# Patient Record
Sex: FEMALE | Race: OTHER | HISPANIC OR LATINO | URBAN - METROPOLITAN AREA
[De-identification: names, ages, dates, MRNs, and addresses within clinical notes are randomized per-mention and may not be internally consistent; named-entity substitution may affect disease eponyms.]

---

## 2020-11-30 ENCOUNTER — APPOINTMENT (OUTPATIENT)
Dept: RADIOLOGY | Facility: HOSPITAL | Age: 25
End: 2020-11-30
Payer: COMMERCIAL

## 2020-11-30 ENCOUNTER — HOSPITAL ENCOUNTER (OUTPATIENT)
Facility: HOSPITAL | Age: 25
Setting detail: OBSERVATION
Discharge: HOME | End: 2020-12-02
Attending: SURGERY | Admitting: SURGERY
Payer: COMMERCIAL

## 2020-11-30 ENCOUNTER — APPOINTMENT (EMERGENCY)
Dept: RADIOLOGY | Facility: HOSPITAL | Age: 25
End: 2020-11-30
Payer: COMMERCIAL

## 2020-11-30 DIAGNOSIS — S32.591A CLOSED FRACTURE OF RIGHT INFERIOR PUBIC RAMUS, INITIAL ENCOUNTER (HCC): Primary | ICD-10-CM

## 2020-11-30 DIAGNOSIS — S32.592A CLOSED FRACTURE OF LEFT INFERIOR PUBIC RAMUS, INITIAL ENCOUNTER (HCC): ICD-10-CM

## 2020-11-30 PROBLEM — S32.009A CLOSED FRACTURE OF TRANSVERSE PROCESS OF LUMBAR VERTEBRA (HCC): Status: ACTIVE | Noted: 2020-11-30

## 2020-11-30 PROBLEM — S27.322A BILATERAL PULMONARY CONTUSION: Status: ACTIVE | Noted: 2020-11-30

## 2020-11-30 LAB
ANION GAP SERPL CALCULATED.3IONS-SCNC: 7 MMOL/L (ref 4–13)
APTT PPP: 22 SECONDS (ref 23–37)
BASE EXCESS BLDA CALC-SCNC: -8 MMOL/L (ref -2–3)
BASOPHILS # BLD AUTO: 0.07 THOUSANDS/ΜL (ref 0–0.1)
BASOPHILS NFR BLD AUTO: 1 % (ref 0–1)
BUN SERPL-MCNC: 16 MG/DL (ref 5–25)
CA-I BLD-SCNC: 1.23 MMOL/L (ref 1.12–1.32)
CALCIUM SERPL-MCNC: 8.9 MG/DL (ref 8.3–10.1)
CHLORIDE SERPL-SCNC: 112 MMOL/L (ref 100–108)
CO2 SERPL-SCNC: 20 MMOL/L (ref 21–32)
CREAT SERPL-MCNC: 1.04 MG/DL (ref 0.6–1.3)
EOSINOPHIL # BLD AUTO: 0.19 THOUSAND/ΜL (ref 0–0.61)
EOSINOPHIL NFR BLD AUTO: 2 % (ref 0–6)
ERYTHROCYTE [DISTWIDTH] IN BLOOD BY AUTOMATED COUNT: 13.1 % (ref 11.6–15.1)
FLUAV RNA RESP QL NAA+PROBE: NEGATIVE
FLUBV RNA RESP QL NAA+PROBE: NEGATIVE
GFR SERPL CREATININE-BSD FRML MDRD: 75 ML/MIN/1.73SQ M
GLUCOSE SERPL-MCNC: 154 MG/DL (ref 65–140)
GLUCOSE SERPL-MCNC: 163 MG/DL (ref 65–140)
HCO3 BLDA-SCNC: 16.7 MMOL/L (ref 24–30)
HCT VFR BLD AUTO: 37.9 % (ref 34.8–46.1)
HCT VFR BLD CALC: 38 % (ref 34.8–46.1)
HGB BLD-MCNC: 12.6 G/DL (ref 11.5–15.4)
HGB BLDA-MCNC: 12.9 G/DL (ref 11.5–15.4)
IMM GRANULOCYTES # BLD AUTO: 0.13 THOUSAND/UL (ref 0–0.2)
IMM GRANULOCYTES NFR BLD AUTO: 1 % (ref 0–2)
INR PPP: 1.06 (ref 0.84–1.19)
LYMPHOCYTES # BLD AUTO: 3.01 THOUSANDS/ΜL (ref 0.6–4.47)
LYMPHOCYTES NFR BLD AUTO: 25 % (ref 14–44)
MCH RBC QN AUTO: 31.5 PG (ref 26.8–34.3)
MCHC RBC AUTO-ENTMCNC: 33.2 G/DL (ref 31.4–37.4)
MCV RBC AUTO: 95 FL (ref 82–98)
MONOCYTES # BLD AUTO: 0.55 THOUSAND/ΜL (ref 0.17–1.22)
MONOCYTES NFR BLD AUTO: 5 % (ref 4–12)
NEUTROPHILS # BLD AUTO: 7.97 THOUSANDS/ΜL (ref 1.85–7.62)
NEUTS SEG NFR BLD AUTO: 66 % (ref 43–75)
NRBC BLD AUTO-RTO: 0 /100 WBCS
PCO2 BLD: 18 MMOL/L (ref 21–32)
PCO2 BLD: 30.3 MM HG (ref 42–50)
PH BLD: 7.35 [PH] (ref 7.3–7.4)
PLATELET # BLD AUTO: 280 THOUSANDS/UL (ref 149–390)
PMV BLD AUTO: 11 FL (ref 8.9–12.7)
PO2 BLD: 38 MM HG (ref 35–45)
POTASSIUM BLD-SCNC: 4 MMOL/L (ref 3.5–5.3)
POTASSIUM SERPL-SCNC: 4.3 MMOL/L (ref 3.5–5.3)
PROTHROMBIN TIME: 13.8 SECONDS (ref 11.6–14.5)
RBC # BLD AUTO: 4 MILLION/UL (ref 3.81–5.12)
RSV RNA RESP QL NAA+PROBE: NEGATIVE
SAO2 % BLD FROM PO2: 70 % (ref 60–85)
SARS-COV-2 RNA RESP QL NAA+PROBE: NEGATIVE
SODIUM BLD-SCNC: 140 MMOL/L (ref 136–145)
SODIUM SERPL-SCNC: 139 MMOL/L (ref 136–145)
SPECIMEN SOURCE: ABNORMAL
WBC # BLD AUTO: 11.92 THOUSAND/UL (ref 4.31–10.16)

## 2020-11-30 PROCEDURE — 70450 CT HEAD/BRAIN W/O DYE: CPT

## 2020-11-30 PROCEDURE — 85014 HEMATOCRIT: CPT

## 2020-11-30 PROCEDURE — 99285 EMERGENCY DEPT VISIT HI MDM: CPT

## 2020-11-30 PROCEDURE — 84132 ASSAY OF SERUM POTASSIUM: CPT

## 2020-11-30 PROCEDURE — 85610 PROTHROMBIN TIME: CPT | Performed by: SURGERY

## 2020-11-30 PROCEDURE — 85025 COMPLETE CBC W/AUTO DIFF WBC: CPT | Performed by: SURGERY

## 2020-11-30 PROCEDURE — NC001 PR NO CHARGE: Performed by: ORTHOPAEDIC SURGERY

## 2020-11-30 PROCEDURE — 74177 CT ABD & PELVIS W/CONTRAST: CPT

## 2020-11-30 PROCEDURE — 36415 COLL VENOUS BLD VENIPUNCTURE: CPT | Performed by: SURGERY

## 2020-11-30 PROCEDURE — 84295 ASSAY OF SERUM SODIUM: CPT

## 2020-11-30 PROCEDURE — 99202 OFFICE O/P NEW SF 15 MIN: CPT | Performed by: ORTHOPAEDIC SURGERY

## 2020-11-30 PROCEDURE — 99219 PR INITIAL OBSERVATION CARE/DAY 50 MINUTES: CPT | Performed by: SURGERY

## 2020-11-30 PROCEDURE — 73560 X-RAY EXAM OF KNEE 1 OR 2: CPT

## 2020-11-30 PROCEDURE — 80048 BASIC METABOLIC PNL TOTAL CA: CPT | Performed by: SURGERY

## 2020-11-30 PROCEDURE — 72190 X-RAY EXAM OF PELVIS: CPT

## 2020-11-30 PROCEDURE — NC001 PR NO CHARGE: Performed by: EMERGENCY MEDICINE

## 2020-11-30 PROCEDURE — 82330 ASSAY OF CALCIUM: CPT

## 2020-11-30 PROCEDURE — 82947 ASSAY GLUCOSE BLOOD QUANT: CPT

## 2020-11-30 PROCEDURE — 85730 THROMBOPLASTIN TIME PARTIAL: CPT | Performed by: SURGERY

## 2020-11-30 PROCEDURE — 72125 CT NECK SPINE W/O DYE: CPT

## 2020-11-30 PROCEDURE — 0241U HB NFCT DS VIR RESP RNA 4 TRGT: CPT | Performed by: SURGERY

## 2020-11-30 PROCEDURE — 82803 BLOOD GASES ANY COMBINATION: CPT

## 2020-11-30 PROCEDURE — 71260 CT THORAX DX C+: CPT

## 2020-11-30 RX ORDER — ONDANSETRON 2 MG/ML
4 INJECTION INTRAMUSCULAR; INTRAVENOUS EVERY 6 HOURS PRN
Status: DISCONTINUED | OUTPATIENT
Start: 2020-11-30 | End: 2020-12-02 | Stop reason: HOSPADM

## 2020-11-30 RX ORDER — TOPIRAMATE 50 MG/1
50 TABLET, FILM COATED ORAL 2 TIMES DAILY
COMMUNITY

## 2020-11-30 RX ORDER — SUMATRIPTAN 50 MG/1
50 TABLET, FILM COATED ORAL ONCE AS NEEDED
COMMUNITY

## 2020-11-30 RX ORDER — OXYCODONE HYDROCHLORIDE 10 MG/1
10 TABLET ORAL EVERY 4 HOURS PRN
Status: DISCONTINUED | OUTPATIENT
Start: 2020-11-30 | End: 2020-12-02 | Stop reason: HOSPADM

## 2020-11-30 RX ORDER — ACETAMINOPHEN 325 MG/1
650 TABLET ORAL EVERY 6 HOURS PRN
Status: DISCONTINUED | OUTPATIENT
Start: 2020-11-30 | End: 2020-12-01

## 2020-11-30 RX ORDER — OXYCODONE HYDROCHLORIDE 5 MG/1
5 TABLET ORAL EVERY 4 HOURS PRN
Status: DISCONTINUED | OUTPATIENT
Start: 2020-11-30 | End: 2020-12-02 | Stop reason: HOSPADM

## 2020-11-30 RX ORDER — SODIUM CHLORIDE 9 MG/ML
100 INJECTION, SOLUTION INTRAVENOUS CONTINUOUS
Status: DISCONTINUED | OUTPATIENT
Start: 2020-11-30 | End: 2020-11-30

## 2020-11-30 RX ORDER — SUMATRIPTAN 50 MG/1
50 TABLET, FILM COATED ORAL EVERY 12 HOURS PRN
Status: DISCONTINUED | OUTPATIENT
Start: 2020-11-30 | End: 2020-12-02 | Stop reason: HOSPADM

## 2020-11-30 RX ORDER — HYDROMORPHONE HCL/PF 1 MG/ML
0.5 SYRINGE (ML) INJECTION
Status: DISCONTINUED | OUTPATIENT
Start: 2020-11-30 | End: 2020-12-02 | Stop reason: HOSPADM

## 2020-11-30 RX ORDER — TOPIRAMATE 25 MG/1
50 TABLET ORAL 2 TIMES DAILY
Status: DISCONTINUED | OUTPATIENT
Start: 2020-11-30 | End: 2020-12-02 | Stop reason: HOSPADM

## 2020-11-30 RX ORDER — SUMATRIPTAN 50 MG/1
50 TABLET, FILM COATED ORAL EVERY 12 HOURS
Status: DISCONTINUED | OUTPATIENT
Start: 2020-11-30 | End: 2020-11-30

## 2020-11-30 RX ORDER — LIDOCAINE 50 MG/G
2 PATCH TOPICAL DAILY
Status: DISCONTINUED | OUTPATIENT
Start: 2020-11-30 | End: 2020-12-02 | Stop reason: HOSPADM

## 2020-11-30 RX ADMIN — OXYCODONE HYDROCHLORIDE 10 MG: 10 TABLET ORAL at 17:22

## 2020-11-30 RX ADMIN — TOPIRAMATE 50 MG: 25 TABLET, FILM COATED ORAL at 17:18

## 2020-11-30 RX ADMIN — IOHEXOL 100 ML: 350 INJECTION, SOLUTION INTRAVENOUS at 05:02

## 2020-11-30 RX ADMIN — OXYCODONE HYDROCHLORIDE 10 MG: 10 TABLET ORAL at 12:14

## 2020-11-30 RX ADMIN — HYDROMORPHONE HYDROCHLORIDE 0.5 MG: 1 INJECTION, SOLUTION INTRAMUSCULAR; INTRAVENOUS; SUBCUTANEOUS at 08:39

## 2020-11-30 RX ADMIN — ONDANSETRON 4 MG: 2 INJECTION INTRAMUSCULAR; INTRAVENOUS at 12:14

## 2020-11-30 RX ADMIN — OXYCODONE HYDROCHLORIDE 5 MG: 5 TABLET ORAL at 08:39

## 2020-11-30 RX ADMIN — LIDOCAINE 2 PATCH: 50 PATCH TOPICAL at 10:10

## 2020-11-30 RX ADMIN — SODIUM CHLORIDE 100 ML/HR: 0.9 INJECTION, SOLUTION INTRAVENOUS at 08:32

## 2020-12-01 LAB
ALBUMIN SERPL BCP-MCNC: 3.7 G/DL (ref 3.5–5)
ALP SERPL-CCNC: 85 U/L (ref 46–116)
ALT SERPL W P-5'-P-CCNC: 84 U/L (ref 12–78)
ANION GAP SERPL CALCULATED.3IONS-SCNC: 8 MMOL/L (ref 4–13)
AST SERPL W P-5'-P-CCNC: 43 U/L (ref 5–45)
BASOPHILS # BLD AUTO: 0.08 THOUSANDS/ΜL (ref 0–0.1)
BASOPHILS NFR BLD AUTO: 1 % (ref 0–1)
BILIRUB SERPL-MCNC: 0.79 MG/DL (ref 0.2–1)
BUN SERPL-MCNC: 11 MG/DL (ref 5–25)
CALCIUM SERPL-MCNC: 9.4 MG/DL (ref 8.3–10.1)
CHLORIDE SERPL-SCNC: 111 MMOL/L (ref 100–108)
CO2 SERPL-SCNC: 21 MMOL/L (ref 21–32)
CREAT SERPL-MCNC: 0.93 MG/DL (ref 0.6–1.3)
EOSINOPHIL # BLD AUTO: 0.13 THOUSAND/ΜL (ref 0–0.61)
EOSINOPHIL NFR BLD AUTO: 1 % (ref 0–6)
ERYTHROCYTE [DISTWIDTH] IN BLOOD BY AUTOMATED COUNT: 13.2 % (ref 11.6–15.1)
GFR SERPL CREATININE-BSD FRML MDRD: 86 ML/MIN/1.73SQ M
GLUCOSE SERPL-MCNC: 98 MG/DL (ref 65–140)
HCT VFR BLD AUTO: 38 % (ref 34.8–46.1)
HGB BLD-MCNC: 12.5 G/DL (ref 11.5–15.4)
IMM GRANULOCYTES # BLD AUTO: 0.01 THOUSAND/UL (ref 0–0.2)
IMM GRANULOCYTES NFR BLD AUTO: 0 % (ref 0–2)
LYMPHOCYTES # BLD AUTO: 2.2 THOUSANDS/ΜL (ref 0.6–4.47)
LYMPHOCYTES NFR BLD AUTO: 23 % (ref 14–44)
MCH RBC QN AUTO: 31.2 PG (ref 26.8–34.3)
MCHC RBC AUTO-ENTMCNC: 32.9 G/DL (ref 31.4–37.4)
MCV RBC AUTO: 95 FL (ref 82–98)
MONOCYTES # BLD AUTO: 0.98 THOUSAND/ΜL (ref 0.17–1.22)
MONOCYTES NFR BLD AUTO: 10 % (ref 4–12)
NEUTROPHILS # BLD AUTO: 6.16 THOUSANDS/ΜL (ref 1.85–7.62)
NEUTS SEG NFR BLD AUTO: 65 % (ref 43–75)
NRBC BLD AUTO-RTO: 0 /100 WBCS
PLATELET # BLD AUTO: 281 THOUSANDS/UL (ref 149–390)
PMV BLD AUTO: 10.3 FL (ref 8.9–12.7)
POTASSIUM SERPL-SCNC: 3.2 MMOL/L (ref 3.5–5.3)
PROT SERPL-MCNC: 7.7 G/DL (ref 6.4–8.2)
RBC # BLD AUTO: 4.01 MILLION/UL (ref 3.81–5.12)
SODIUM SERPL-SCNC: 140 MMOL/L (ref 136–145)
WBC # BLD AUTO: 9.56 THOUSAND/UL (ref 4.31–10.16)

## 2020-12-01 PROCEDURE — 85025 COMPLETE CBC W/AUTO DIFF WBC: CPT | Performed by: PHYSICIAN ASSISTANT

## 2020-12-01 PROCEDURE — 97162 PT EVAL MOD COMPLEX 30 MIN: CPT

## 2020-12-01 PROCEDURE — 97166 OT EVAL MOD COMPLEX 45 MIN: CPT

## 2020-12-01 PROCEDURE — 99225 PR SBSQ OBSERVATION CARE/DAY 25 MINUTES: CPT | Performed by: SURGERY

## 2020-12-01 PROCEDURE — 80053 COMPREHEN METABOLIC PANEL: CPT | Performed by: PHYSICIAN ASSISTANT

## 2020-12-01 RX ORDER — GABAPENTIN 100 MG/1
100 CAPSULE ORAL 3 TIMES DAILY
Status: DISCONTINUED | OUTPATIENT
Start: 2020-12-01 | End: 2020-12-02 | Stop reason: HOSPADM

## 2020-12-01 RX ORDER — CALCIUM CARBONATE 200(500)MG
500 TABLET,CHEWABLE ORAL DAILY PRN
Status: DISCONTINUED | OUTPATIENT
Start: 2020-12-01 | End: 2020-12-02 | Stop reason: HOSPADM

## 2020-12-01 RX ORDER — ACETAMINOPHEN 325 MG/1
975 TABLET ORAL EVERY 8 HOURS SCHEDULED
Status: DISCONTINUED | OUTPATIENT
Start: 2020-12-01 | End: 2020-12-02 | Stop reason: HOSPADM

## 2020-12-01 RX ORDER — METHOCARBAMOL 750 MG/1
750 TABLET, FILM COATED ORAL EVERY 6 HOURS SCHEDULED
Status: DISCONTINUED | OUTPATIENT
Start: 2020-12-01 | End: 2020-12-02 | Stop reason: HOSPADM

## 2020-12-01 RX ORDER — PANTOPRAZOLE SODIUM 40 MG/1
40 TABLET, DELAYED RELEASE ORAL
Status: DISCONTINUED | OUTPATIENT
Start: 2020-12-01 | End: 2020-12-02 | Stop reason: HOSPADM

## 2020-12-01 RX ADMIN — OXYCODONE HYDROCHLORIDE 10 MG: 10 TABLET ORAL at 09:15

## 2020-12-01 RX ADMIN — GABAPENTIN 100 MG: 100 CAPSULE ORAL at 17:23

## 2020-12-01 RX ADMIN — OXYCODONE HYDROCHLORIDE 10 MG: 10 TABLET ORAL at 05:03

## 2020-12-01 RX ADMIN — LIDOCAINE 2 PATCH: 50 PATCH TOPICAL at 09:15

## 2020-12-01 RX ADMIN — TOPIRAMATE 50 MG: 25 TABLET, FILM COATED ORAL at 17:23

## 2020-12-01 RX ADMIN — OXYCODONE HYDROCHLORIDE 10 MG: 10 TABLET ORAL at 20:55

## 2020-12-01 RX ADMIN — METHOCARBAMOL TABLETS 750 MG: 750 TABLET, COATED ORAL at 17:23

## 2020-12-01 RX ADMIN — ENOXAPARIN SODIUM 30 MG: 30 INJECTION SUBCUTANEOUS at 17:24

## 2020-12-01 RX ADMIN — GABAPENTIN 100 MG: 100 CAPSULE ORAL at 09:14

## 2020-12-01 RX ADMIN — METHOCARBAMOL TABLETS 750 MG: 750 TABLET, COATED ORAL at 09:14

## 2020-12-01 RX ADMIN — TOPIRAMATE 50 MG: 25 TABLET, FILM COATED ORAL at 09:14

## 2020-12-01 RX ADMIN — ENOXAPARIN SODIUM 30 MG: 30 INJECTION SUBCUTANEOUS at 05:04

## 2020-12-01 RX ADMIN — ONDANSETRON 4 MG: 2 INJECTION INTRAMUSCULAR; INTRAVENOUS at 11:28

## 2020-12-01 RX ADMIN — GABAPENTIN 100 MG: 100 CAPSULE ORAL at 20:55

## 2020-12-01 RX ADMIN — ACETAMINOPHEN 975 MG: 325 TABLET, FILM COATED ORAL at 09:14

## 2020-12-02 VITALS
SYSTOLIC BLOOD PRESSURE: 110 MMHG | TEMPERATURE: 98.1 F | BODY MASS INDEX: 27.1 KG/M2 | HEART RATE: 79 BPM | WEIGHT: 147.27 LBS | DIASTOLIC BLOOD PRESSURE: 66 MMHG | HEIGHT: 62 IN | RESPIRATION RATE: 18 BRPM | OXYGEN SATURATION: 99 %
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
## 2020-12-02 PROCEDURE — 99217 PR OBSERVATION CARE DISCHARGE MANAGEMENT: CPT | Performed by: PHYSICIAN ASSISTANT

## 2020-12-02 PROCEDURE — NC001 PR NO CHARGE: Performed by: PHYSICIAN ASSISTANT

## 2020-12-02 RX ORDER — AMOXICILLIN 250 MG
1 CAPSULE ORAL
Status: DISCONTINUED | OUTPATIENT
Start: 2020-12-02 | End: 2020-12-02 | Stop reason: HOSPADM

## 2020-12-02 RX ORDER — OXYCODONE HYDROCHLORIDE 5 MG/1
5-10 TABLET ORAL EVERY 4 HOURS PRN
Qty: 36 TABLET | Refills: 0 | Status: SHIPPED | OUTPATIENT
Start: 2020-12-02

## 2020-12-02 RX ORDER — GABAPENTIN 100 MG/1
100 CAPSULE ORAL 3 TIMES DAILY
Qty: 42 CAPSULE | Refills: 0 | Status: SHIPPED | OUTPATIENT
Start: 2020-12-02 | End: 2020-12-16

## 2020-12-02 RX ORDER — ACETAMINOPHEN 325 MG/1
650 TABLET ORAL EVERY 4 HOURS PRN
Qty: 120 TABLET | Refills: 0 | Status: SHIPPED | OUTPATIENT
Start: 2020-12-02

## 2020-12-02 RX ORDER — METHOCARBAMOL 750 MG/1
750 TABLET, FILM COATED ORAL EVERY 6 HOURS SCHEDULED
Qty: 40 TABLET | Refills: 1 | Status: SHIPPED | OUTPATIENT
Start: 2020-12-02 | End: 2020-12-09

## 2020-12-02 RX ADMIN — PANTOPRAZOLE SODIUM 40 MG: 40 TABLET, DELAYED RELEASE ORAL at 05:50

## 2020-12-02 RX ADMIN — ACETAMINOPHEN 975 MG: 325 TABLET, FILM COATED ORAL at 05:50

## 2020-12-02 RX ADMIN — LIDOCAINE 2 PATCH: 50 PATCH TOPICAL at 08:28

## 2020-12-02 RX ADMIN — ENOXAPARIN SODIUM 30 MG: 30 INJECTION SUBCUTANEOUS at 05:50

## 2020-12-02 RX ADMIN — GABAPENTIN 100 MG: 100 CAPSULE ORAL at 08:29

## 2020-12-02 RX ADMIN — METHOCARBAMOL TABLETS 750 MG: 750 TABLET, COATED ORAL at 05:50

## 2020-12-02 RX ADMIN — TOPIRAMATE 50 MG: 25 TABLET, FILM COATED ORAL at 08:29

## 2020-12-20 DIAGNOSIS — S32.591A CLOSED FRACTURE OF RIGHT INFERIOR PUBIC RAMUS, INITIAL ENCOUNTER (HCC): Primary | ICD-10-CM

## 2020-12-23 ENCOUNTER — HOSPITAL ENCOUNTER (OUTPATIENT)
Dept: RADIOLOGY | Facility: HOSPITAL | Age: 25
Discharge: HOME | End: 2020-12-23
Attending: ORTHOPAEDIC SURGERY
Payer: COMMERCIAL

## 2020-12-23 ENCOUNTER — OFFICE VISIT (OUTPATIENT)
Dept: OBGYN CLINIC | Facility: HOSPITAL | Age: 25
End: 2020-12-23
Payer: COMMERCIAL

## 2020-12-23 VITALS
DIASTOLIC BLOOD PRESSURE: 76 MMHG | WEIGHT: 140 LBS | HEART RATE: 75 BPM | SYSTOLIC BLOOD PRESSURE: 116 MMHG | BODY MASS INDEX: 25.76 KG/M2 | HEIGHT: 62 IN

## 2020-12-23 DIAGNOSIS — S32.591A CLOSED FRACTURE OF RIGHT INFERIOR PUBIC RAMUS, INITIAL ENCOUNTER (HCC): ICD-10-CM

## 2020-12-23 DIAGNOSIS — S32.592D: Primary | ICD-10-CM

## 2020-12-23 PROCEDURE — 72190 X-RAY EXAM OF PELVIS: CPT

## 2020-12-23 PROCEDURE — 99203 OFFICE O/P NEW LOW 30 MIN: CPT | Performed by: ORTHOPAEDIC SURGERY

## 2020-12-28 ENCOUNTER — EVALUATION (OUTPATIENT)
Dept: PHYSICAL THERAPY | Facility: REHABILITATION | Age: 25
End: 2020-12-28
Payer: COMMERCIAL

## 2020-12-28 DIAGNOSIS — S32.592S CLOSED FRACTURE OF MULTIPLE PUBIC RAMI, LEFT, SEQUELA: ICD-10-CM

## 2020-12-28 DIAGNOSIS — S32.009S LUMBAR TRANSVERSE PROCESS FRACTURE, SEQUELA: Primary | ICD-10-CM

## 2020-12-28 PROCEDURE — 97112 NEUROMUSCULAR REEDUCATION: CPT | Performed by: PHYSICAL THERAPIST

## 2020-12-28 PROCEDURE — 97162 PT EVAL MOD COMPLEX 30 MIN: CPT | Performed by: PHYSICAL THERAPIST

## 2020-12-30 ENCOUNTER — OFFICE VISIT (OUTPATIENT)
Dept: PHYSICAL THERAPY | Facility: REHABILITATION | Age: 25
End: 2020-12-30
Payer: COMMERCIAL

## 2020-12-30 DIAGNOSIS — S32.009S LUMBAR TRANSVERSE PROCESS FRACTURE, SEQUELA: Primary | ICD-10-CM

## 2020-12-30 DIAGNOSIS — S32.592S CLOSED FRACTURE OF MULTIPLE PUBIC RAMI, LEFT, SEQUELA: ICD-10-CM

## 2020-12-30 PROCEDURE — 97110 THERAPEUTIC EXERCISES: CPT | Performed by: PHYSICAL THERAPIST

## 2020-12-30 PROCEDURE — 97112 NEUROMUSCULAR REEDUCATION: CPT | Performed by: PHYSICAL THERAPIST

## 2021-01-04 ENCOUNTER — OFFICE VISIT (OUTPATIENT)
Dept: PHYSICAL THERAPY | Facility: REHABILITATION | Age: 26
End: 2021-01-04
Payer: COMMERCIAL

## 2021-01-04 DIAGNOSIS — S32.592S CLOSED FRACTURE OF MULTIPLE PUBIC RAMI, LEFT, SEQUELA: ICD-10-CM

## 2021-01-04 DIAGNOSIS — S32.009S LUMBAR TRANSVERSE PROCESS FRACTURE, SEQUELA: Primary | ICD-10-CM

## 2021-01-04 PROCEDURE — 97112 NEUROMUSCULAR REEDUCATION: CPT | Performed by: PHYSICAL THERAPIST

## 2021-01-04 PROCEDURE — 97110 THERAPEUTIC EXERCISES: CPT | Performed by: PHYSICAL THERAPIST

## 2021-01-04 PROCEDURE — 97140 MANUAL THERAPY 1/> REGIONS: CPT | Performed by: PHYSICAL THERAPIST
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
## 2021-01-06 ENCOUNTER — OFFICE VISIT (OUTPATIENT)
Dept: PHYSICAL THERAPY | Facility: REHABILITATION | Age: 26
End: 2021-01-06
Payer: COMMERCIAL

## 2021-01-06 DIAGNOSIS — S32.009S LUMBAR TRANSVERSE PROCESS FRACTURE, SEQUELA: Primary | ICD-10-CM

## 2021-01-06 DIAGNOSIS — S32.592S CLOSED FRACTURE OF MULTIPLE PUBIC RAMI, LEFT, SEQUELA: ICD-10-CM

## 2021-01-06 PROCEDURE — 97112 NEUROMUSCULAR REEDUCATION: CPT | Performed by: PHYSICAL THERAPIST

## 2021-01-06 PROCEDURE — 97110 THERAPEUTIC EXERCISES: CPT | Performed by: PHYSICAL THERAPIST
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
## 2021-01-11 ENCOUNTER — OFFICE VISIT (OUTPATIENT)
Dept: PHYSICAL THERAPY | Facility: REHABILITATION | Age: 26
End: 2021-01-11
Payer: COMMERCIAL

## 2021-01-11 DIAGNOSIS — S32.592S CLOSED FRACTURE OF MULTIPLE PUBIC RAMI, LEFT, SEQUELA: ICD-10-CM

## 2021-01-11 DIAGNOSIS — S32.009S LUMBAR TRANSVERSE PROCESS FRACTURE, SEQUELA: Primary | ICD-10-CM

## 2021-01-11 PROCEDURE — 97110 THERAPEUTIC EXERCISES: CPT | Performed by: PHYSICAL THERAPIST

## 2021-01-11 PROCEDURE — 97112 NEUROMUSCULAR REEDUCATION: CPT | Performed by: PHYSICAL THERAPIST

## 2021-01-11 NOTE — PROGRESS NOTES
Daily Note     Today's date: 2021  Patient name: Juvencio Faust  : 1995  MRN: [id number]  Referring provider: Steve Kumar MD  Dx:   Encounter Diagnosis     ICD-10-CM    1  Lumbar transverse process fracture, sequela  S32 009S    2  Closed fracture of multiple pubic rami, left, sequela  S32 592S        Start Time: 0900  Stop Time: 0945  Total time in clinic (min): 45 minutes    Subjective: Patient reports that she has been feeling well, walking with less pain but still has pain if lying on her left side at night  Objective: See treatment diary below  Gait normalizing  Performs SLS with deviation, pelvic drop  Knee ext MMT = 4+/5, Flexion = 4+/5 on L  Hip flexion MMT = 3+/5 on L      Assessment: Tolerated treatment well  Patient was able to progress with lifts from F-W but did have increased L sided leg pain posteriorly that was improved with nerve glides  Plan: Continue per plan of care        Precautions: pubic rami fx, L5 TP fx on left 2020      Manuals           Hip PROM to tolerance             Assessment-hip prom  10 5'                                      Neuro Re-Ed           [place]-supine lift  bungex1-B/L rope-2x10 bungex1-B/L rope-3x5                       Seated sciatic nerve glides    2x10         PNF Chop/lift/horiz pull    OTB  5"x5 each - perf B/L         VG L5-3' L7 -20+10 NP          Squat - from low mat   10# KB-2x10 10# KB  2x10  15# KB 10x         UL farmer sandy   15# KB- R side- 30 ftx8          Seated pball roll out 2x10  15x          F-W lits     25#  5+7+3         Prone knee flexion (femoral nerve)  2x10            Ther Ex          Bike 5' 5' 5' 7'         TM   5' - 1 2 mph 5'  1 5-2 0 mph         LAQ  2x10 GTB NP                                                                           Ther Activity                                       Gait Training                                       Modalities  MHP Pre-10' LS seated

## 2021-01-13 ENCOUNTER — APPOINTMENT (OUTPATIENT)
Dept: PHYSICAL THERAPY | Facility: REHABILITATION | Age: 26
End: 2021-01-13
Payer: COMMERCIAL

## 2021-01-14 ENCOUNTER — OFFICE VISIT (OUTPATIENT)
Dept: PHYSICAL THERAPY | Facility: REHABILITATION | Age: 26
End: 2021-01-14
Payer: COMMERCIAL

## 2021-01-14 DIAGNOSIS — S32.009S LUMBAR TRANSVERSE PROCESS FRACTURE, SEQUELA: Primary | ICD-10-CM

## 2021-01-14 DIAGNOSIS — S32.592S CLOSED FRACTURE OF MULTIPLE PUBIC RAMI, LEFT, SEQUELA: ICD-10-CM

## 2021-01-14 PROCEDURE — 97112 NEUROMUSCULAR REEDUCATION: CPT

## 2021-01-14 PROCEDURE — 97110 THERAPEUTIC EXERCISES: CPT
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
## 2021-01-18 ENCOUNTER — OFFICE VISIT (OUTPATIENT)
Dept: PHYSICAL THERAPY | Facility: REHABILITATION | Age: 26
End: 2021-01-18
Payer: COMMERCIAL

## 2021-01-18 DIAGNOSIS — S32.592S CLOSED FRACTURE OF MULTIPLE PUBIC RAMI, LEFT, SEQUELA: ICD-10-CM

## 2021-01-18 DIAGNOSIS — S32.009S LUMBAR TRANSVERSE PROCESS FRACTURE, SEQUELA: Primary | ICD-10-CM

## 2021-01-18 PROCEDURE — 97110 THERAPEUTIC EXERCISES: CPT | Performed by: PHYSICAL THERAPIST

## 2021-01-18 PROCEDURE — 97112 NEUROMUSCULAR REEDUCATION: CPT | Performed by: PHYSICAL THERAPIST

## 2021-01-18 NOTE — PROGRESS NOTES
Daily Note     Today's date: 2021  Patient name: Eliecer Milian  : 1995  MRN: [id number]  Referring provider: Marni Triana MD  Dx:   Encounter Diagnosis     ICD-10-CM    1  Lumbar transverse process fracture, sequela  S32 009S    2  Closed fracture of multiple pubic rami, left, sequela  S32 592S        Start Time: 1030  Stop Time: 1115  Total time in clinic (min): 45 minutes    Subjective: Interpretor used (significant other) Patient reports that she continues to feel improvements but notes that she did have some more pain on Saturday after doing a lot of walking  Objective: See treatment diary below      Assessment: Tolerated treatment well  Patient continues with lifting pain free as I also was able to increase her resistances  Encouraged to continue with HEP as well as pushing oneself with ADLs  Plan: Continue per plan of care        Precautions: pubic rami fx, L5 TP fx on left 2020      Manuals         Hip PROM to tolerance             Assessment-hip prom  10 5'                                      Neuro Re-Ed         [place]-supine lift  bungex1-B/L rope-2x10 bungex1-B/L rope-3x5                       Seated sciatic nerve glides    2x10 np        PNF Chop/lift/horiz pull    OTB  5"x5 each - perf B/L GTB  5"x5 chop/horz    OTB  lift - perf B/L        VG L5-3' L7 -20+10 NP          Squat - from low mat   10# KB-2x10 10# KB  2x10  15# KB 10x 15#  KB  2x10 15#  KB  2x10        farmer sandy   15# KB- R side- 30 ftx8  15# KB- R side- 30 ftx10 25# KB- R side- 1' x3       Seated pball roll out 2x10  15x          F-W lifts     25#  5+7+3 25#  2x8 25#  2x8       Prone knee flexion (femoral nerve)  2x10            Ther Ex        Bike 5' 5' 5' 7' 7' 5'       TM   5' - 1 2 mph 5'  1 5-2 0 mph 6'  1 5-2 0 mph 8'  2 0 mph       LAQ  2x10 GTB NP Ther Activity                                       Gait Training                                       Modalities 12/30            MHP Pre-10' LS seated

## 2021-01-25 ENCOUNTER — OFFICE VISIT (OUTPATIENT)
Dept: PHYSICAL THERAPY | Facility: REHABILITATION | Age: 26
End: 2021-01-25
Payer: COMMERCIAL

## 2021-01-25 DIAGNOSIS — S32.592S CLOSED FRACTURE OF MULTIPLE PUBIC RAMI, LEFT, SEQUELA: ICD-10-CM

## 2021-01-25 DIAGNOSIS — S32.009S LUMBAR TRANSVERSE PROCESS FRACTURE, SEQUELA: Primary | ICD-10-CM

## 2021-01-25 PROCEDURE — 97110 THERAPEUTIC EXERCISES: CPT | Performed by: PHYSICAL THERAPIST

## 2021-01-25 PROCEDURE — 97112 NEUROMUSCULAR REEDUCATION: CPT | Performed by: PHYSICAL THERAPIST

## 2021-01-25 NOTE — PROGRESS NOTES
Daily Note     Today's date: 2021  Patient name: Londell Romberg  : 1995  MRN: [id number]  Referring provider: Jae Blanco MD  Dx:   Encounter Diagnosis     ICD-10-CM    1  Lumbar transverse process fracture, sequela  S32 009S    2  Closed fracture of multiple pubic rami, left, sequela  S32 592S        Start Time: 1015  Stop Time: 1100  Total time in clinic (min): 45 minutes    Subjective: Patient reports that she continues to feel well, did have soreness after she was very active 2 days over the weekend  She even trailed running which was pain free  Objective: See treatment diary below      Assessment: Tolerated treatment well  Patient performs all lifts pain free, able to increase weight as per flow sheet  She is progressing extremely well and will consider DC in 2-3 weeks pending progress/pain  Plan: Continue per plan of care  Precautions: pubic rami fx, L5 TP fx on left 2020      Manuals         Hip PROM to tolerance             Assessment-hip prom  10 5'                                      Neuro Re-Ed        [place]-supine lift  bungex1-B/L rope-2x10 bungex1-B/L rope-3x5                       OH lifting       3x5-25#      Seated sciatic nerve glides    2x10 np        PNF Chop/lift/horiz pull    OTB  5"x5 each - perf B/L GTB  5"x5 chop/horz    OTB  lift - perf B/L BJB 15x  Chop and horiz  OJB lift  B/L BJB 15x  Chop and horiz  OJB lift    B/L      VG L5-3' L7 -20+10 NP          Squat - from low mat   10# KB-2x10 10# KB  2x10  15# KB 10x 15#  KB  2x10 15#  KB  2x10 NP      UL farmer sandy   15# KB- R side- 30 ftx8  15# KB- R side- 30 ftx10 25# KB- R side- 1' x3 25# box today-1'x2      Seated pball roll out 2x10  15x          F-W lifts     25#  5+7+3 25#  2x8 25#  2x8 25#-5x  35#  5x+10x  45# x5      Prone knee flexion (femoral nerve)  2x10            Ther Ex       Bike 5' 5' 5' 7' 7' 5'       TM   5' - 1 2 mph 5'  1 5-2 0 mph 6'  1 5-2 0 mph 8'  2 0 mph 10'  2  3mph      LAQ  2x10 GTB NP                                                                           Ther Activity                                       Gait Training                                       Modalities 12/30            MHP Pre-10' LS seated

## 2021-01-27 ENCOUNTER — OFFICE VISIT (OUTPATIENT)
Dept: PHYSICAL THERAPY | Facility: REHABILITATION | Age: 26
End: 2021-01-27
Payer: COMMERCIAL

## 2021-01-27 DIAGNOSIS — S32.009S LUMBAR TRANSVERSE PROCESS FRACTURE, SEQUELA: Primary | ICD-10-CM

## 2021-01-27 DIAGNOSIS — S32.592S CLOSED FRACTURE OF MULTIPLE PUBIC RAMI, LEFT, SEQUELA: ICD-10-CM

## 2021-01-27 PROCEDURE — 97112 NEUROMUSCULAR REEDUCATION: CPT | Performed by: PHYSICAL THERAPIST

## 2021-01-27 PROCEDURE — 97110 THERAPEUTIC EXERCISES: CPT | Performed by: PHYSICAL THERAPIST

## 2021-01-27 NOTE — PROGRESS NOTES
Daily Note     Today's date: 2021  Patient name: Marilyn Bob  : 1995  MRN: [id number]  Referring provider: Arvin Dennis MD  Dx:   Encounter Diagnosis     ICD-10-CM    1  Lumbar transverse process fracture, sequela  S32 009S    2  Closed fracture of multiple pubic rami, left, sequela  S32 592S        Start Time: 1000  Stop Time: 1055  Total time in clinic (min): 55 minutes    Subjective: Patient reports that she has been doing well overall as she did not have pain post last session  She only has been getting pain at night after activity throughout the day  Objective: See treatment diary below      Assessment: Tolerated treatment well  Patient performs lifts pain free  Pain NW post REIL  If pain continues will contiue with repeated movement assessment as well as potential joint mobilization  Plan: Continue per plan of care  Precautions: pubic rami fx, L5 TP fx on left 2020      Manuals       Hip PROM to tolerance             Assessment-hip prom  10 5'            STM LS paraspinals-L        3'     assessment        5'     Neuro Re-Ed       [place]-supine lift  bungex1-B/L rope-2x10 bungex1-B/L rope-3x5                       OH lifting       3x5-25# 3x5-25#     Seated sciatic nerve glides    2x10 np        PNF Chop/lift/horiz pull    OTB  5"x5 each - perf B/L GTB  5"x5 chop/horz    OTB  lift - perf B/L BJB 15x  Chop and horiz  OJB lift  B/L BJB 15x  Chop and horiz  OJB lift  B/L BJB 15x  Chop and horiz  OJB lift    B/L     VG L5-3' L7 -20+10 NP          Squat - from low mat   10# KB-2x10 10# KB  2x10  15# KB 10x 15#  KB  2x10 15#  KB  2x10 NP      UL farmer sandy   15# KB- R side- 30 ftx8  15# KB- R side- 30 ftx10 25# KB- R side- 1' x3 25# box today-1'x2 30# box today-1'x3     Seated pball roll out 2x10  15x          F-W lifts     25#  5+7+3 25#  2x8 25#  2x8 25#-5x  35#  5x+10x  45# x5 20#-5x  40#  2x10 Prone knee flexion (femoral nerve)  2x10            Ther Ex 12/30 1/4 1/6 1/11 1/14 1/18 1/25 1/27     Bike 5' 5' 5' 7' 7' 5'       TM   5' - 1 2 mph 5'  1 5-2 0 mph 6'  1 5-2 0 mph 8'  2 0 mph 10'  2  3mph 10' 2 3 mph     LAQ  2x10 GTB NP          REIL        3x10                                                         Ther Activity                                       Gait Training                                       Modalities 12/30            MHP Pre-10' LS seated

## 2021-02-01 ENCOUNTER — APPOINTMENT (OUTPATIENT)
Dept: PHYSICAL THERAPY | Facility: REHABILITATION | Age: 26
End: 2021-02-01
Payer: COMMERCIAL

## 2021-02-05 ENCOUNTER — APPOINTMENT (OUTPATIENT)
Dept: PHYSICAL THERAPY | Facility: REHABILITATION | Age: 26
End: 2021-02-05
Payer: COMMERCIAL

## 2021-02-10 ENCOUNTER — OFFICE VISIT (OUTPATIENT)
Dept: PHYSICAL THERAPY | Facility: REHABILITATION | Age: 26
End: 2021-02-10
Payer: COMMERCIAL

## 2021-02-10 DIAGNOSIS — S32.009S LUMBAR TRANSVERSE PROCESS FRACTURE, SEQUELA: Primary | ICD-10-CM

## 2021-02-10 DIAGNOSIS — S32.592S CLOSED FRACTURE OF MULTIPLE PUBIC RAMI, LEFT, SEQUELA: ICD-10-CM

## 2021-02-10 PROCEDURE — 97112 NEUROMUSCULAR REEDUCATION: CPT | Performed by: PHYSICAL THERAPIST

## 2021-02-10 PROCEDURE — 97140 MANUAL THERAPY 1/> REGIONS: CPT | Performed by: PHYSICAL THERAPIST
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
## 2021-02-12 ENCOUNTER — APPOINTMENT (OUTPATIENT)
Dept: PHYSICAL THERAPY | Facility: REHABILITATION | Age: 26
End: 2021-02-12
Payer: COMMERCIAL

## 2021-02-15 ENCOUNTER — APPOINTMENT (OUTPATIENT)
Dept: PHYSICAL THERAPY | Facility: REHABILITATION | Age: 26
End: 2021-02-15
Payer: COMMERCIAL

## 2021-03-08 NOTE — PROGRESS NOTES
Patient was last treated on 2/10/2021 and has no remaining appointments scheduled  Goals, objective and subjective information unable to be updated at this time  Patient will be discharged from physical therapy at this time secondary to noncompliance with plan of care  Attempts made to follow up with patient, no response returned
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
## 2024-04-01 NOTE — PROGRESS NOTES
Daily Note     Today's date: 2021  Patient name: Londell Romberg  : 1995  MRN: [id number]  Referring provider: Jae Blanco MD  Dx: No diagnosis found  Subjective: ***      Objective: See treatment diary below      Assessment: Tolerated treatment {Tolerated treatment :[id number]}   Patient {assessment:[id number]}      Plan: {PLAN:1053170322}     Precautions: pubic rami fx, L5 TP fx on left 2020
oral

## 2024-07-25 ENCOUNTER — OFFICE VISIT (OUTPATIENT)
Dept: DENTISTRY | Facility: CLINIC | Age: 29
End: 2024-07-25

## 2024-07-25 VITALS — SYSTOLIC BLOOD PRESSURE: 133 MMHG | TEMPERATURE: 97.1 F | DIASTOLIC BLOOD PRESSURE: 85 MMHG | HEART RATE: 74 BPM

## 2024-07-25 DIAGNOSIS — K01.1 IMPACTED THIRD MOLAR TOOTH: Primary | ICD-10-CM

## 2024-07-25 DIAGNOSIS — K05.10 GINGIVITIS: ICD-10-CM

## 2024-07-25 DIAGNOSIS — K02.9 CARIES: ICD-10-CM

## 2024-07-25 DIAGNOSIS — K02.9 COMPLEX DENTAL CARIES: ICD-10-CM

## 2024-07-25 DIAGNOSIS — M26.4 MALOCCLUSION: ICD-10-CM

## 2024-07-25 PROCEDURE — D0150 COMPREHENSIVE ORAL EVALUATION - NEW OR ESTABLISHED PATIENT: HCPCS | Performed by: DENTIST

## 2024-07-25 PROCEDURE — D0210 INTRAORAL - COMPLETE SERIES OF RADIOGRAPHIC IMAGES: HCPCS | Performed by: DENTIST

## 2024-07-25 NOTE — DENTAL PROCEDURE DETAILS
"Comprehensive Exam and FMX    Magdalenachristianne JOSEFINA Tellez 28 y.o. female presents with self to [facility] for comprehensive exam.  PMH reviewed, no changes, ASA II. Significant medical history: see list. Significant allergies: denies. Significant medications: see list.  Pain level: 0/10  Chief complaint: :Check up\"   Consent:  Reviewed procedures involved with comprehensive exam including radiographs, oral exam, and periodontal probing.   Patient understands and consent was given by self via verbal consent.  Radiographs: FMX.  Oral cancer screening: normal.  Extraoral exam: no remarkable findings.  Intraoral exam: gingival inflammation. Caries. Partially impacted wisdom teeth.   Periodontal exam: See periodontal charting.   Hygiene - Fair.  Plaque - Moderate.  Horizontal bone loss -  UR: Mild (<15%).  UL: Mild (<15%).  LL: Mild (<15%).  LR: Mild (<15%).  Vertical bone loss - None.  Subgingival calculus - Generalized.  BOP - Generalized.  Mobility - None.  Furcation involvements - None.  Occlusal trauma - Traumatic check bite associated with wisdom teeth. Patient reported history of that.   Smoker - No.  Diabetic - No.  Periodontal Stage: Severe gingivitis.  Periodontal Grade: A.  Periodontal Plan: Prophy.    Caries exam:   Caries detected: Teeth #2 OL, #14 OL, #15 OL, #19 O. Recommended restorations. Watch teeth 34 D, #14 OL and #31 Occlusal. Deep caries of partially impacted teeth #17 and #32 as well as distobuccal erupted, traumatic occlusion and carious teeth #1, and #16. Recommended extractions.     Occlusal assessment:  VDO / restorative space - Normal.  AP Classification -  Right: Canine Class I; Molar Class I.  Left: Canine Class I; Molar Class I.  Crowding of anterior teeth and anterio open bite.  Recommended for orthodontic referral? Yes.      Tx plan:  1- Prophy.   2- Teeth #2 OL, #14 OL, #15 OL, #19 O. Recommended restorations.  3- Extraction of teeth #1,16,17,32.     Referral:   1- [facility] for extractions of teeth " #1,16,17,32.  2- Ortho for comprehensive orthodontic evaluation and treatment.   Rx: None.  Recommended recall schedule: 6 months.  POI is given. Reviewed oral hygiene and need for recall visits.   Patient dismissed ambulatory and alert.    NV1: Prophy with hyg.  NV2: Restorative care.

## 2024-07-26 ENCOUNTER — TELEPHONE (OUTPATIENT)
Dept: DENTISTRY | Facility: CLINIC | Age: 29
End: 2024-07-26

## 2024-10-02 ENCOUNTER — OFFICE VISIT (OUTPATIENT)
Dept: DENTISTRY | Facility: CLINIC | Age: 29
End: 2024-10-02

## 2024-10-02 VITALS — DIASTOLIC BLOOD PRESSURE: 76 MMHG | HEART RATE: 88 BPM | SYSTOLIC BLOOD PRESSURE: 114 MMHG | TEMPERATURE: 97.7 F

## 2024-10-02 DIAGNOSIS — Z01.20 ENCOUNTER FOR DENTAL EXAMINATION: Primary | ICD-10-CM

## 2024-10-02 PROBLEM — T31.0 BURN (ANY DEGREE) INVOLVING LESS THAN 10% OF BODY SURFACE: Status: ACTIVE | Noted: 2023-06-30

## 2024-10-02 PROBLEM — G43.109 MIGRAINE WITH AURA: Status: ACTIVE | Noted: 2022-01-19

## 2024-10-02 PROBLEM — T21.25XA PARTIAL THICKNESS BURN OF BUTTOCK: Status: ACTIVE | Noted: 2023-06-30

## 2024-10-02 PROBLEM — Z37.9 NORMAL LABOR: Status: ACTIVE | Noted: 2023-07-19

## 2024-10-02 PROCEDURE — D1110 PROPHYLAXIS - ADULT: HCPCS | Performed by: DENTAL HYGIENIST

## 2024-10-02 PROCEDURE — D1330 ORAL HYGIENE INSTRUCTIONS: HCPCS | Performed by: DENTAL HYGIENIST

## 2024-10-02 RX ORDER — TOPIRAMATE 25 MG/1
TABLET, FILM COATED ORAL
COMMUNITY
Start: 2024-07-25

## 2024-10-02 NOTE — DENTAL PROCEDURE DETAILS
ASA  I  Pain - 0  Reviewed M/DH    Prophylaxis completed with ultrasonic  and hand instrumentation.    ---Lt to mod black sub calc - tenacious, lt plaque  ---Pt states it has been about 10 yrs since last cleaning  ---Soft plaque removed and sub/ supragingival calculus removed from all teeth - some fine residual may remain.    ---Polished with prophy cup and paste.    ---Flossed and provided Oral Health Instructions.    ---Demonstrated proper brushing and flossing technique.    ---Patient left satisfied and ambulatory.    Exam:  none  Referral:  none  Tx plan:  no changes    NV1:  Rest 15 - OL - 60 min - w/ Memar  NV2:  6mrc - 50 min

## 2024-10-03 ENCOUNTER — OFFICE VISIT (OUTPATIENT)
Dept: DENTISTRY | Facility: CLINIC | Age: 29
End: 2024-10-03

## 2024-10-03 VITALS — HEART RATE: 103 BPM | DIASTOLIC BLOOD PRESSURE: 74 MMHG | SYSTOLIC BLOOD PRESSURE: 118 MMHG | TEMPERATURE: 98.2 F

## 2024-10-03 DIAGNOSIS — K02.9 CARIES: Primary | ICD-10-CM

## 2024-10-03 PROCEDURE — D2391 RESIN-BASED COMPOSITE - 1 SURFACE, POSTERIOR: HCPCS | Performed by: DENTIST

## 2024-10-03 NOTE — DENTAL PROCEDURE DETAILS
Composite Restoration #2 Occlusal    Beth Stevenson Geoff 28 y.o. female presents with self to [facility] for composite restoration  PMH reviewed, no changes, ASA II. Significant medical history: see list. Significant allergies: denies. Significant medications: see list.  Pain Level: 0/10  Diagnosis:  tooth #2 occlusal caries.  Radiographs: current.   Consent:  Risks of specific procedure: need for RCT if pulp exposure occurs or in future if pulp is inflamed, need to revise tx plan based on extent of decay, damage to adjacent tooth and/or restoration.  Risks of any dental procedure: post procedural pain or sensitivity, local anesthetic side effects, allergic reaction to dental materials and medications, breakage of local anesthetic needle, aspiration of small dental tools, injury to nearby hard and soft tissues and anatomical structures.  Benefits: prevent further breakdown of tooth and its sequelae.  Alternatives: no tx.  Tx plan for composite restoration #2 occlusal reviewed. Opportunity to ask questions given, all questions answered to degree of medical and dental certainty.  Patient understands and consent given by self via verbal consent.  Anesthesia:  Topical 20% benzocaine.  One carps 2% Lidocaine 1:100k epi via buccal infiltration.  Procedure details:  Isolation: cotton rolls, dry angles, and high volume suction  Prepped tooth #2 occlusal with high speed handpiece.  Caries removed with round carbide on slow speed.  Band placement: not applicable/needed for this restoration.   Etch with 37% H2PO4 15 seconds. Rinsed and suctioned.  Applied  with 20 second scrub, air dried, and light cured.  Restored with packable and flowable (A2 shade) and light cured.  Checked occlusion and adjusted with finishing burs.  Checked contacts with floss  Polished with enhance point.  Verified occlusion and contacts.  POI is given. Reviewed oral hygiene and need for recall visits.   Patient dismissed ambulatory and  alert.  NV: Continue fillings.